# Patient Record
Sex: FEMALE | Race: WHITE
[De-identification: names, ages, dates, MRNs, and addresses within clinical notes are randomized per-mention and may not be internally consistent; named-entity substitution may affect disease eponyms.]

---

## 2019-10-27 ENCOUNTER — HOSPITAL ENCOUNTER (EMERGENCY)
Dept: HOSPITAL 92 - SCSER | Age: 54
Discharge: HOME | End: 2019-10-27
Payer: COMMERCIAL
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
## 2019-10-27 DIAGNOSIS — S43.402A: Primary | ICD-10-CM

## 2019-10-27 DIAGNOSIS — F32.9: ICD-10-CM

## 2019-10-27 DIAGNOSIS — Z79.899: ICD-10-CM

## 2019-10-27 DIAGNOSIS — I10: ICD-10-CM

## 2019-10-27 DIAGNOSIS — E78.2: ICD-10-CM

## 2019-10-27 PROCEDURE — 93005 ELECTROCARDIOGRAM TRACING: CPT

## 2019-10-27 PROCEDURE — 96372 THER/PROPH/DIAG INJ SC/IM: CPT

## 2019-10-27 NOTE — RAD
LEFT SHOULDER 3 VIEWS:

 

Date:  10/27/19 

 

HISTORY:  

Left shoulder pain. 

 

FINDINGS/IMPRESSION: 

No fracture, dislocation, or bony destruction seen. There is suggestion of calcific tendinosis. 

 

 

POS: [facility]